# Patient Record
Sex: FEMALE
[De-identification: names, ages, dates, MRNs, and addresses within clinical notes are randomized per-mention and may not be internally consistent; named-entity substitution may affect disease eponyms.]

---

## 2023-09-11 ENCOUNTER — HOSPITAL ENCOUNTER (OUTPATIENT)
Dept: HOSPITAL 47 - RADUSWWP | Age: 78
Discharge: HOME | End: 2023-09-11
Attending: ORTHOPAEDIC SURGERY
Payer: MEDICARE

## 2023-09-11 DIAGNOSIS — M11.262: ICD-10-CM

## 2023-09-11 DIAGNOSIS — M17.0: ICD-10-CM

## 2023-09-11 DIAGNOSIS — I80.3: Primary | ICD-10-CM

## 2023-09-11 DIAGNOSIS — M11.261: ICD-10-CM

## 2023-09-11 NOTE — US
EXAMINATION TYPE: US venous doppler duplex LE RT

 

DATE OF EXAM: 9/11/2023 12:38 PM

 

COMPARISON: NONE

 

CLINICAL INDICATION: Female, 77 years old with history of I80.9 PHLEBITIS; Pain right knee

 

SIDE PERFORMED: right   

 

TECHNIQUE:  The lower extremity deep venous system is examined utilizing real time linear array sonog
emerson with graded compression, doppler sonography and color-flow sonography.

 

VESSELS IMAGED:

Common Femoral Vein

Deep Femoral Vein

Greater Saphenous Vein *

Femoral Vein

Popliteal Vein

Small Saphenous Vein *

Proximal Calf Veins

(* superficial vessels)

 

 

 

Right Leg:  No evidence of DVT 

 

 

 

 

IMPRESSION:  Grayscale, color doppler, spectral doppler imaging performed of the deep veins of the lo
wer extremities.  There is normal flow, compressibility, vascular waveforms.